# Patient Record
(demographics unavailable — no encounter records)

---

## 2025-05-27 NOTE — DISCUSSION/SUMMARY
[FreeTextEntry1] : 1.  Hyperlipidemia: Will obtain blood work and advise once results are known.  Offered to switch to Praluent or Leqvio.  Patient will do his research regarding insurance coverage. 2.  Hypertension: Borderline advised to continue current medication maintain a low-salt diet and weight loss.  EKG reviewed sinus rhythm unchanged from prior tracings. [EKG obtained to assist in diagnosis and management of assessed problem(s)] : EKG obtained to assist in diagnosis and management of assessed problem(s) 58

## 2025-05-27 NOTE — HISTORY OF PRESENT ILLNESS
[FreeTextEntry1] : 48-year-old male with past medical history of hyperlipidemia hypertension comes in for follow-up.  Due to the urological side effects patient discontinued Repatha 3 months ago.  Currently denies any chest pain shortness of breath PND orthopnea.

## 2025-05-27 NOTE — PHYSICAL EXAM
